# Patient Record
Sex: MALE | Race: WHITE | NOT HISPANIC OR LATINO | Employment: OTHER | URBAN - METROPOLITAN AREA
[De-identification: names, ages, dates, MRNs, and addresses within clinical notes are randomized per-mention and may not be internally consistent; named-entity substitution may affect disease eponyms.]

---

## 2017-09-20 NOTE — PATIENT DISCUSSION
Trichiasis Counseling: The diagnosis of trichiasis was explained to the patient. The possibility of the recurrence of the problem leading to permanent damage to the cornea and vision loss was also explained. The risks, benefits and alternatives of hyfrecation were reviewed with the patient. The patient understands and wishes to proceed with hyfrecation today.

## 2017-09-20 NOTE — PATIENT DISCUSSION
Hyfrecation: The patient had trichiasis of the left lower eyelid the patient had epilation completed in the past.  The patient requested a more permanent solution. After informed consent the patient was given topical anesthetic drops followed by a local anesthetic of 1cc lidocaine 1% with epinephrine 1:100,000. A hyfrecation probe was placed in the abnormal eyelash follicle with a current setting of 5. The lash was removed with epilation forceps including the follicle. The patient received topical antibiotic ointment to the operative site. The patient was given post operative care instructions and a prescription for antibiotic ointment.

## 2017-09-20 NOTE — PATIENT DISCUSSION
Hyfrecation: The patient had trichiasis of the right lower eyelid the patient had epilation completed in the past.  The patient requested a more permanent solution. After informed consent the patient was given topical anesthetic drops followed by a local anesthetic of 1cc lidocaine 1% with epinephrine 1:100,000. A hyfrecation probe was placed in the abnormal eyelash follicle with a current setting of 5. The lash was removed with epilation forceps including the follicle. The patient received topical antibiotic ointment to the operative site. The patient was given post operative care instructions and a prescription for antibiotic ointment.

## 2017-09-20 NOTE — PATIENT DISCUSSION
Hyfrecation: The patient had trichiasis of the left upper eyelid the patient had epilation completed in the past.  The patient requested a more permanent solution. After informed consent the patient was given topical anesthetic drops followed by a local anesthetic of 1cc lidocaine 1% with epinephrine 1:100,000. A hyfrecation probe was placed in the abnormal eyelash follicle with a current setting of 5. The lash was removed with epilation forceps including the follicle. The patient received topical antibiotic ointment to the operative site. The patient was given post operative care instructions and a prescription for antibiotic ointment.

## 2018-01-30 NOTE — PATIENT DISCUSSION
Patient presents w/ recurrent trichiasis OU. Discussed the r/b of a surgical procedure to help alleviate these symptoms. Patient understands and wishes to proceed with surgery.

## 2018-02-02 ENCOUNTER — ESTABLISHED COMPREHENSIVE EXAM (OUTPATIENT)
Dept: URBAN - METROPOLITAN AREA CLINIC 43 | Facility: CLINIC | Age: 78
End: 2018-02-02

## 2018-02-02 DIAGNOSIS — E11.9: ICD-10-CM

## 2018-02-02 DIAGNOSIS — H25.813: ICD-10-CM

## 2018-02-02 PROCEDURE — 2022F DILAT RTA XM EVC RTNOPTHY: CPT

## 2018-02-02 PROCEDURE — G8785 BP SCRN NO PERF AT INTERVAL: HCPCS

## 2018-02-02 PROCEDURE — 1036F TOBACCO NON-USER: CPT

## 2018-02-02 PROCEDURE — 92015 DETERMINE REFRACTIVE STATE: CPT

## 2018-02-02 PROCEDURE — 92014 COMPRE OPH EXAM EST PT 1/>: CPT

## 2018-02-02 PROCEDURE — 3072F LOW RISK FOR RETINOPATHY: CPT

## 2018-02-02 PROCEDURE — G8427 DOCREV CUR MEDS BY ELIG CLIN: HCPCS

## 2018-02-02 ASSESSMENT — VISUAL ACUITY
OS_CC: J1
OS_SC: 20/70
OD_CC: J1+
OD_SC: 20/60
OS_CC: 20/40-2
OS_SC: J10
OD_SC: J10
OD_CC: 20/30-2

## 2018-02-02 ASSESSMENT — TONOMETRY
OS_IOP_MMHG: 18
OD_IOP_MMHG: 17

## 2018-04-06 NOTE — PATIENT DISCUSSION
Also, please do not hesitate to call us if you have any concerns not addressed by this information. Please call [phone] and we will do everything we can to help you during this period.

## 2019-03-22 ENCOUNTER — ESTABLISHED COMPREHENSIVE EXAM (OUTPATIENT)
Dept: URBAN - METROPOLITAN AREA CLINIC 43 | Facility: CLINIC | Age: 79
End: 2019-03-22

## 2019-03-22 DIAGNOSIS — H25.813: ICD-10-CM

## 2019-03-22 DIAGNOSIS — E11.9: ICD-10-CM

## 2019-03-22 PROCEDURE — 92015 DETERMINE REFRACTIVE STATE: CPT

## 2019-03-22 PROCEDURE — 92014 COMPRE OPH EXAM EST PT 1/>: CPT

## 2019-03-22 ASSESSMENT — VISUAL ACUITY
OD_CC: J1
OD_CC: 20/25-3
OS_SC: 20/60-1
OS_SC: J12
OD_SC: 20/60+2
OD_SC: J12
OS_CC: 20/40+1
OS_CC: J2

## 2019-03-22 ASSESSMENT — TONOMETRY
OD_IOP_MMHG: 18
OS_IOP_MMHG: 18

## 2019-04-30 NOTE — PATIENT DISCUSSION
Also, please do not hesitate to call us if you have any concerns not addressed by this information. Please call [phone] and we will do everything we can to help you during this period.

## 2019-11-01 NOTE — PATIENT DISCUSSION
ENTROPION, OU:  SCHEDULE LATERAL TARSAL STRIP, RIGHT LOWER EYELID AND LEFT LOWER EYELID W/ REMOVAL OF EYELASHES BOTH LOWER EYELIDS.

## 2019-12-03 NOTE — PATIENT DISCUSSION
Also, please do not hesitate to call us if you have any concerns not addressed by this information. Please call [phone] and we will do everything we can to help you during this period.

## 2020-07-10 ENCOUNTER — ESTABLISHED COMPREHENSIVE EXAM (OUTPATIENT)
Dept: URBAN - METROPOLITAN AREA CLINIC 43 | Facility: CLINIC | Age: 80
End: 2020-07-10

## 2020-07-10 DIAGNOSIS — H25.813: ICD-10-CM

## 2020-07-10 DIAGNOSIS — H52.03: ICD-10-CM

## 2020-07-10 DIAGNOSIS — E11.9: ICD-10-CM

## 2020-07-10 DIAGNOSIS — H52.203: ICD-10-CM

## 2020-07-10 PROCEDURE — 92015 DETERMINE REFRACTIVE STATE: CPT

## 2020-07-10 PROCEDURE — 92014 COMPRE OPH EXAM EST PT 1/>: CPT

## 2020-07-10 ASSESSMENT — VISUAL ACUITY
OD_CC: J1
OD_SC: 20/60
OS_SC: 20/70-1
OS_CC: J1
OD_BAT: 20/60-1
OS_SC: J12-
OD_SC: J12
OS_BAT: 20/60-1
OS_CC: 20/40
OD_CC: 20/30-2

## 2020-07-10 ASSESSMENT — TONOMETRY
OS_IOP_MMHG: 17
OD_IOP_MMHG: 16

## 2021-06-11 ENCOUNTER — ESTABLISHED COMPREHENSIVE EXAM (OUTPATIENT)
Dept: URBAN - METROPOLITAN AREA CLINIC 43 | Facility: CLINIC | Age: 81
End: 2021-06-11

## 2021-06-11 DIAGNOSIS — E11.9: ICD-10-CM

## 2021-06-11 DIAGNOSIS — H52.03: ICD-10-CM

## 2021-06-11 DIAGNOSIS — H25.813: ICD-10-CM

## 2021-06-11 DIAGNOSIS — H52.4: ICD-10-CM

## 2021-06-11 PROCEDURE — 92014 COMPRE OPH EXAM EST PT 1/>: CPT

## 2021-06-11 PROCEDURE — 92015 DETERMINE REFRACTIVE STATE: CPT

## 2021-06-11 ASSESSMENT — TONOMETRY
OS_IOP_MMHG: 16
OD_IOP_MMHG: 16

## 2021-06-11 ASSESSMENT — VISUAL ACUITY
OS_SC: J12
OS_SC: 20/80-1
OD_SC: J12
OD_CC: 20/30-2
OS_CC: J2-
OD_CC: J2-
OD_SC: 20/80-1
OS_CC: 20/30-2

## 2022-06-17 ENCOUNTER — COMPREHENSIVE EXAM (OUTPATIENT)
Dept: URBAN - METROPOLITAN AREA CLINIC 43 | Facility: CLINIC | Age: 82
End: 2022-06-17

## 2022-06-17 DIAGNOSIS — H25.813: ICD-10-CM

## 2022-06-17 DIAGNOSIS — H52.03: ICD-10-CM

## 2022-06-17 DIAGNOSIS — E11.9: ICD-10-CM

## 2022-06-17 PROCEDURE — 92015 DETERMINE REFRACTIVE STATE: CPT

## 2022-06-17 PROCEDURE — 92014 COMPRE OPH EXAM EST PT 1/>: CPT

## 2022-06-17 ASSESSMENT — VISUAL ACUITY
OD_CC: J3
OD_CC: 20/30
OS_SC: 20/80-1
OD_SC: J12
OD_SC: 20/70
OS_CC: J2
OS_SC: J12
OS_CC: 20/40+2

## 2022-06-17 ASSESSMENT — TONOMETRY
OD_IOP_MMHG: 17
OS_IOP_MMHG: 16

## 2023-06-20 ENCOUNTER — COMPREHENSIVE EXAM (OUTPATIENT)
Dept: URBAN - METROPOLITAN AREA CLINIC 43 | Facility: CLINIC | Age: 83
End: 2023-06-20

## 2023-06-20 DIAGNOSIS — H25.813: ICD-10-CM

## 2023-06-20 DIAGNOSIS — H52.03: ICD-10-CM

## 2023-06-20 DIAGNOSIS — E11.9: ICD-10-CM

## 2023-06-20 PROCEDURE — 92015 DETERMINE REFRACTIVE STATE: CPT

## 2023-06-20 PROCEDURE — 92014 COMPRE OPH EXAM EST PT 1/>: CPT

## 2023-06-20 ASSESSMENT — VISUAL ACUITY
OU_SC: J6
OD_CC: 20/30-2
OU_CC: J1
OU_SC: 20/60
OS_SC: 20/80-1
OS_CC: 20/25-2
OD_SC: 20/80
OU_CC: 20/25-2
OS_CC: J1
OD_SC: J10
OS_SC: J10
OD_CC: J3

## 2023-06-20 ASSESSMENT — TONOMETRY
OD_IOP_MMHG: 18
OS_IOP_MMHG: 18

## 2024-06-21 ENCOUNTER — COMPREHENSIVE EXAM (OUTPATIENT)
Dept: URBAN - METROPOLITAN AREA CLINIC 43 | Facility: CLINIC | Age: 84
End: 2024-06-21

## 2024-06-21 DIAGNOSIS — H52.03: ICD-10-CM

## 2024-06-21 DIAGNOSIS — E11.9: ICD-10-CM

## 2024-06-21 DIAGNOSIS — H52.203: ICD-10-CM

## 2024-06-21 DIAGNOSIS — H25.813: ICD-10-CM

## 2024-06-21 PROCEDURE — 92014 COMPRE OPH EXAM EST PT 1/>: CPT

## 2024-06-21 PROCEDURE — 92015 DETERMINE REFRACTIVE STATE: CPT

## 2024-06-21 ASSESSMENT — VISUAL ACUITY
OD_CC: J3
OS_SC: J10-
OD_SC: J10-
OD_SC: 20/50-2
OS_SC: 20/70+2
OS_CC: 20/25-2
OS_CC: J2
OD_CC: 20/30+1

## 2024-06-21 ASSESSMENT — TONOMETRY
OS_IOP_MMHG: 19
OD_IOP_MMHG: 20

## 2025-06-24 ENCOUNTER — COMPREHENSIVE EXAM (OUTPATIENT)
Age: 85
End: 2025-06-24

## 2025-06-24 DIAGNOSIS — H52.03: ICD-10-CM

## 2025-06-24 DIAGNOSIS — H25.813: ICD-10-CM

## 2025-06-24 DIAGNOSIS — H43.813: ICD-10-CM

## 2025-06-24 DIAGNOSIS — E11.9: ICD-10-CM

## 2025-06-24 PROCEDURE — 92015 DETERMINE REFRACTIVE STATE: CPT

## 2025-06-24 PROCEDURE — 92134 CPTRZ OPH DX IMG PST SGM RTA: CPT

## 2025-06-24 PROCEDURE — 92014 COMPRE OPH EXAM EST PT 1/>: CPT
